# Patient Record
Sex: MALE | Race: WHITE | NOT HISPANIC OR LATINO | URBAN - METROPOLITAN AREA
[De-identification: names, ages, dates, MRNs, and addresses within clinical notes are randomized per-mention and may not be internally consistent; named-entity substitution may affect disease eponyms.]

---

## 2023-11-25 ENCOUNTER — OFFICE VISIT (OUTPATIENT)
Dept: URGENT CARE | Facility: CLINIC | Age: 23
End: 2023-11-25

## 2023-11-25 VITALS
SYSTOLIC BLOOD PRESSURE: 118 MMHG | OXYGEN SATURATION: 99 % | RESPIRATION RATE: 17 BRPM | DIASTOLIC BLOOD PRESSURE: 69 MMHG | WEIGHT: 128.63 LBS | HEART RATE: 68 BPM | HEIGHT: 64 IN | TEMPERATURE: 99 F | BODY MASS INDEX: 21.96 KG/M2

## 2023-11-25 DIAGNOSIS — J00 ACUTE RHINITIS: Primary | ICD-10-CM

## 2023-11-25 DIAGNOSIS — R68.89 FLU-LIKE SYMPTOMS: ICD-10-CM

## 2023-11-25 LAB
FLUAV AG UPPER RESP QL IA.RAPID: NOT DETECTED
FLUBV AG UPPER RESP QL IA.RAPID: NOT DETECTED
RSV A 5' UTR RNA NPH QL NAA+PROBE: NOT DETECTED
SARS-COV-2 RNA RESP QL NAA+PROBE: NOT DETECTED
STREP A PCR (OHS): NOT DETECTED

## 2023-11-25 PROCEDURE — 99203 PR OFFICE/OUTPT VISIT, NEW, LEVL III, 30-44 MIN: ICD-10-PCS | Mod: S$PBB,,, | Performed by: NURSE PRACTITIONER

## 2023-11-25 PROCEDURE — 99203 OFFICE O/P NEW LOW 30 MIN: CPT | Mod: S$PBB,,, | Performed by: NURSE PRACTITIONER

## 2023-11-25 PROCEDURE — 99204 OFFICE O/P NEW MOD 45 MIN: CPT | Mod: PBBFAC | Performed by: NURSE PRACTITIONER

## 2023-11-25 PROCEDURE — 87651 STREP A DNA AMP PROBE: CPT | Performed by: NURSE PRACTITIONER

## 2023-11-25 PROCEDURE — 0241U COVID/RSV/FLU A&B PCR: CPT | Performed by: NURSE PRACTITIONER

## 2023-11-25 RX ORDER — FLUTICASONE PROPIONATE 50 MCG
2 SPRAY, SUSPENSION (ML) NASAL DAILY
Qty: 16 G | Refills: 0 | Status: SHIPPED | OUTPATIENT
Start: 2023-11-25

## 2023-11-25 NOTE — LETTER
November 25, 2023      [facility]  [address] [address]  Phone: [phone]       Patient: Kimo Grigsby   YOB: 2000  Date of Visit: 11/25/2023    To Whom It May Concern:    Bere Grigsby  was at [facility] on 11/25/2023. The patient may return to work/school on 11/27/2023 with no restrictions. If you have any questions or concerns, or if I can be of further assistance, please do not hesitate to contact me.    Sincerely,    NITA Salinas

## 2023-11-26 NOTE — PATIENT INSTRUCTIONS
Please follow instructions on patient education material.      Return to urgent care in 2 to 3 days if symptoms are not improving, immediately if you develop any new or worsening symptoms.     - OTC cold/flu products as desired for symptoms  - Plenty of fluids  - Humidified air  - Tylenol or Motrin for pain/fever  FLU/ COVID/ RSV/ Strep PCR pending    Go to the ER if you experience chest pain with shortness of breath, shortness of breath when moving around your house, high fevers 103.0+, excessive vomiting/diarrhea, or general distress.

## 2023-11-26 NOTE — PROGRESS NOTES
"Subjective:      Patient ID: Kimo Grigsby is a 22 y.o. male.    Vitals:  height is 5' 4" (1.626 m) and weight is 58.3 kg (128 lb 9.6 oz). His temperature is 98.7 °F (37.1 °C). His blood pressure is 118/69 and his pulse is 68. His respiration is 17.     Chief Complaint: URI (Ha, congestion)    URI      As stated in CC. Pt c/o headache, nasal congestion  x 2 days. Pt reports taking tylenol which resolved his symptoms. Pt states " I've missed a couple days of work and need excuse. Pt denies cough fever, dizziness, weakness, abd pain, n/v/d, chest pain or shortness of breath and cough.    Skin:  Negative for erythema.      Objective:     Physical Exam   Constitutional: He appears well-developed.  Non-toxic appearance. He does not appear ill. No distress.   HENT:   Head: Normocephalic and atraumatic.   Ears:   Right Ear: Tympanic membrane normal.   Left Ear: Tympanic membrane normal.   Nose: Rhinorrhea and congestion present. No purulent discharge. Right sinus exhibits no maxillary sinus tenderness and no frontal sinus tenderness. Left sinus exhibits no maxillary sinus tenderness and no frontal sinus tenderness.   Mouth/Throat: Uvula is midline. Mucous membranes are moist. No oropharyngeal exudate or posterior oropharyngeal erythema.   Eyes: Conjunctivae are normal. Right eye exhibits no discharge. Left eye exhibits no discharge. Extraocular movement intact   Neck: Neck supple. No neck rigidity present.   Cardiovascular: Normal rate and regular rhythm.   Pulmonary/Chest: Effort normal and breath sounds normal. No respiratory distress. He has no wheezes. He has no rales.   Abdominal: Normal appearance. Soft.   Musculoskeletal: Normal range of motion.         General: Normal range of motion.   Lymphadenopathy:     He has no cervical adenopathy.   Neurological: no focal deficit. He is alert.   Skin: Skin is warm, dry, not diaphoretic and no rash. Capillary refill takes less than 2 seconds. No erythema   Psychiatric: His " behavior is normal. Mood, judgment and thought content normal.   Nursing note and vitals reviewed.      Assessment:     1. Acute rhinitis    2. Flu-like symptoms        Plan:   Initially patient refused swabs,   PCR test pending.  - OTC cold/flu products as desired for symptoms  - Plenty of fluids  - Humidified air  - Tylenol or Motrin for pain/fever  FLU/ COVID/ RSV/ Strep PCR pending    Acute rhinitis    Flu-like symptoms  -     Cancel: COVID/RSV/FLU A&B PCR; Future; Expected date: 11/25/2023  -     COVID/RSV/FLU A&B PCR  -     Strep Group A by PCR; Future; Expected date: 11/25/2023    Other orders  -     fluticasone propionate (FLONASE) 50 mcg/actuation nasal spray; 2 sprays (100 mcg total) by Each Nostril route once daily.  Dispense: 16 g; Refill: 0